# Patient Record
Sex: MALE | Race: WHITE
[De-identification: names, ages, dates, MRNs, and addresses within clinical notes are randomized per-mention and may not be internally consistent; named-entity substitution may affect disease eponyms.]

---

## 2018-09-17 ENCOUNTER — HOSPITAL ENCOUNTER (EMERGENCY)
Dept: HOSPITAL 97 - ER | Age: 33
Discharge: HOME | End: 2018-09-17
Payer: SELF-PAY

## 2018-09-17 DIAGNOSIS — N23: Primary | ICD-10-CM

## 2018-09-17 DIAGNOSIS — F17.210: ICD-10-CM

## 2018-09-17 PROCEDURE — 99284 EMERGENCY DEPT VISIT MOD MDM: CPT

## 2018-09-17 PROCEDURE — 96374 THER/PROPH/DIAG INJ IV PUSH: CPT

## 2018-09-17 PROCEDURE — 81003 URINALYSIS AUTO W/O SCOPE: CPT

## 2018-09-17 NOTE — EDPHYS
Physician Documentation                                                                           

 [facility]                                                                

Name: Cristofer Pelletier                                                                                 

Age: 33 yrs                                                                                       

Sex: Male                                                                                         

: 1985                                                                                   

MRN: [id number]                                                                                   

Arrival Date: 2018                                                                          

Time: 01:58                                                                                       

Account#: [id number]                                                                            

Bed 20                                                                                            

Private MD:                                                                                       

ED Physician Javier Espinoza                                                                       

HPI:                                                                                              

                                                                                             

03:25 This 33 yrs old  Male presents to ER via EMS with complaints of Flank Pain.    gs  

03:25 The patient complains of pain in the right low back. The pain does not radiate. Onset:  gs  

      The symptoms/episode began/occurred 1 week(s) ago, and became persistent dx with 3 mm       

      kidney stone. Modifying factors: The symptoms are alleviated by nothing. the symptoms       

      are aggravated by nothing. Associated signs and symptoms: Pertinent negatives:              

      dizziness, dysuria. Severity of pain: At its worst the pain was moderate in the             

      emergency department the pain is unchanged. The patient has experienced similar             

      episodes in the past, a few times. The patient has been recently seen by a physician:       

      in the hospital.                                                                            

                                                                                                  

Historical:                                                                                       

- Allergies:                                                                                      

02:04 No Known Allergies;                                                                     ea  

- Home Meds:                                                                                      

02:04 None [Active];                                                                          ea  

- PMHx:                                                                                           

02:04 None;                                                                                   ea  

- PSHx:                                                                                           

02:04 None;                                                                                   ea  

                                                                                                  

- Immunization history:: Adult Immunizations up to date.                                          

- Social history:: Smoking status: Patient uses tobacco products, smokes one-half pack            

  cigarettes per day.                                                                             

- Ebola Screening: : No symptoms or risks identified at this time.                                

                                                                                                  

                                                                                                  

ROS:                                                                                              

03:25 All other systems are negative.                                                         gs  

                                                                                                  

Exam:                                                                                             

03:25 Head/Face:  Normocephalic, atraumatic. Eyes:  Pupils equal round and reactive to light, gs  

      extra-ocular motions intact.  Lids and lashes normal.  Conjunctiva and sclera are           

      non-icteric and not injected.  Cornea within normal limits.  Periorbital areas with no      

      swelling, redness, or edema. ENT:  Nares patent. No nasal discharge, no septal              

      abnormalities noted.  Tympanic membranes are normal and external auditory canals are        

      clear.  Oropharynx with no redness, swelling, or masses, exudates, or evidence of           

      obstruction, uvula midline.  Mucous membranes moist. Neck:  Trachea midline, no             

      thyromegaly or masses palpated, and no cervical lymphadenopathy.  Supple, full range of     

      motion without nuchal rigidity, or vertebral point tenderness.  No Meningismus.             

      Chest/axilla:  Normal chest wall appearance and motion.  Nontender with no deformity.       

      No lesions are appreciated. Cardiovascular:  Regular rate and rhythm with a normal S1       

      and S2.  No gallops, murmurs, or rubs.  Normal PMI, no JVD.  No pulse deficits.             

      Respiratory:  Lungs have equal breath sounds bilaterally, clear to auscultation and         

      percussion.  No rales, rhonchi or wheezes noted.  No increased work of breathing, no        

      retractions or nasal flaring. Abdomen/GI:  Soft, non-tender, with normal bowel sounds.      

      No distension or tympany.  No guarding or rebound.  No evidence of tenderness               

      throughout. Back:  No spinal tenderness.  No costovertebral tenderness.  Full range of      

      motion. Skin:  Warm, dry with normal turgor.  Normal color with no rashes, no lesions,      

      and no evidence of cellulitis. MS/ Extremity:  Pulses equal, no cyanosis.                   

      Neurovascular intact.  Full, normal range of motion. Neuro:  Awake and alert, GCS 15,       

      oriented to person, place, time, and situation.  Cranial nerves II-XII grossly intact.      

      Motor strength 5/5 in all extremities.  Sensory grossly intact.  Cerebellar exam            

      normal.  Normal gait.                                                                       

03:25 Constitutional: The patient appears alert, awake.                                           

                                                                                                  

Vital Signs:                                                                                      

02:06  / 97; Pulse 94; Resp 18; Temp 98.2(O); Pulse Ox 98% on R/A; Weight 72.57 kg;     ea  

      Height 6 ft. 0 in. (182.88 cm); Pain 8/10;                                                  

03:30  / 78; Pulse 72; Resp 18; Temp 98; Pulse Ox 98% ; Pain 2/10;                      ea  

02:06 Body Mass Index 21.70 (72.57 kg, 182.88 cm)                                             ea  

                                                                                                  

MDM:                                                                                              

02:39 Patient medically screened.                                                             gs  

03:25 Data reviewed: vital signs, nurses notes. Response to treatment: the patient's symptoms gs  

      have markedly improved after treatment, and as a result, I will discharge patient.          

03:30 Counseling: I had a detailed discussion with the patient and/or guardian regarding: the gs  

      presence of at least one elevated blood pressure reading (>120/80) during this              

      emergency department visit.                                                                 

                                                                                                  

                                                                                             

02:51 Order name: Urine Dipstick--Ancillary (enter results)                                   ms  

                                                                                             

02:40 Order name: Urine Dipstick-Ancillary (obtain specimen); Complete Time: 02:48            gs  

                                                                                                  

Administered Medications:                                                                         

03:00 Drug: NS 0.9% 1000 ml Route: IV; Rate: 1 bolus; Site: right antecubital;                ea  

03:00 Drug: TORadol 30 mg Route: IVP; Site: right antecubital;                                ea  

03:26 Follow up: Response: No adverse reaction; Pain is decreased                             ea  

                                                                                                  

                                                                                                  

Disposition:                                                                                      

18 03:29 Discharged to Home. Impression: Colic - renal.                                     

- Condition is Stable.                                                                            

- Discharge Instructions: Kidney Stones, Easy-to-Read.                                            

                                                                                                  

- Medication Reconciliation Form, Thank You Letter, Antibiotic Education, Prescription            

  Opioid Use form.                                                                                

- Follow up: Ana Day MD; When: 2 - 3 days; Reason: Re-evaluation by your                 

  physician.                                                                                      

                                                                                                  

                                                                                                  

                                                                                                  

Signatures:                                                                                       

Dispatcher [employer]                           Umu Nunez RN                      RN   Javier Raya MD MD gs                                                   

                                                                                                  

Corrections: (The following items were deleted from the chart)                                    

03:59 03:29 2018 03:29 Discharged to Home. Impression: Colic - renal. Condition is      ea  

      Stable. Forms are Medication Reconciliation Form, Thank You Letter, Antibiotic              

      Education, Prescription Opioid Use. Follow up: Ana Day; When: 2 - 3 days;            

      Reason: Re-evaluation by your physician. gs                                                 

                                                                                                  

**************************************************************************************************

## 2018-09-17 NOTE — ER
Nurse's Notes                                                                                     

 [facility]                                                                

Name: Cristofer Pelletier                                                                                 

Age: 33 yrs                                                                                       

Sex: Male                                                                                         

: 1985                                                                                   

MRN: [id number]                                                                                   

Arrival Date: 2018                                                                          

Time: 01:58                                                                                       

Account#: [id number]                                                                            

Bed 20                                                                                            

Private MD:                                                                                       

Diagnosis: Colic-renal                                                                            

                                                                                                  

Presentation:                                                                                     

                                                                                             

01:59 Presenting complaint: EMS states: Called to [facility], pt complaining of right flank pain ea  

      that radiates to his stomach. Pt reported he was at [facility] about a week     

      ago and was diagnosed with kidney stones. Transition of care: patient was not received      

      from another setting of care. Onset of symptoms was 2018. Risk                

      Assessment: Do you want to hurt yourself or someone else? Patient reports no desire to      

      harm self or others. Initial Sepsis Screen: Does the patient meet any 2 criteria? No.       

      Patient's initial sepsis screen is negative. Does the patient have a suspected source       

      of infection? No. Patient's initial sepsis screen is negative. Care prior to arrival:       

      None.                                                                                       

01:59 Method Of Arrival: EMS: [facility]                                                ea  

01:59 Acuity: ONEL 3                                                                           ea  

                                                                                                  

Triage Assessment:                                                                                

02:04 General: Appears uncomfortable, Behavior is calm, cooperative, appropriate for age.     ea  

      Pain: Complains of pain in right flank pain Pain radiates to abdomen Pain currently is      

      7 out of 10 on a pain scale. Quality of pain is described as aching, Pain began 30 min      

      ago. EENT: No signs and/or symptoms were reported regarding the EENT system. Neuro:         

      Level of Consciousness is awake, alert, obeys commands, Oriented to person, place,          

      time, situation. Cardiovascular: Heart tones S1 S2 present Patient's skin is warm and       

      dry. Respiratory: Airway is patent Respiratory effort is even, unlabored, Respiratory       

      pattern is regular, symmetrical, Breath sounds are clear bilaterally. GI: Abdomen is        

      non-distended. : Reports burning with urination. Derm: Skin is pink, warm \T\ dry.        

      Musculoskeletal: Circulation, motion, and sensation intact.                                 

                                                                                                  

Historical:                                                                                       

- Allergies:                                                                                      

02:04 No Known Allergies;                                                                     ea  

- Home Meds:                                                                                      

02:04 None [Active];                                                                          ea  

- PMHx:                                                                                           

02:04 None;                                                                                   ea  

- PSHx:                                                                                           

02:04 None;                                                                                   ea  

                                                                                                  

- Immunization history:: Adult Immunizations up to date.                                          

- Social history:: Smoking status: Patient uses tobacco products, smokes one-half pack            

  cigarettes per day.                                                                             

- Ebola Screening: : No symptoms or risks identified at this time.                                

                                                                                                  

                                                                                                  

Screenin:07 Abuse screen: Denies threats or abuse. Nutritional screening: No deficits noted.        ea  

      Tuberculosis screening: No symptoms or risk factors identified. Fall Risk None              

      identified.                                                                                 

                                                                                                  

Assessment:                                                                                       

03:00 Reassessment: Patient and/or family updated on plan of care and expected duration. Pain ea  

      level reassessed. Patient is alert, oriented x 3, equal unlabored respirations, skin        

      warm/dry/pink.                                                                              

03:45 Reassessment: Patient and/or family updated on plan of care and expected duration. Pain ea  

      level reassessed. Patient is alert, oriented x 3, equal unlabored respirations, skin        

      warm/dry/pink. Discharge instructions given to patient, verbalized the understanding of     

      instruction.                                                                                

                                                                                                  

Vital Signs:                                                                                      

02:06  / 97; Pulse 94; Resp 18; Temp 98.2(O); Pulse Ox 98% on R/A; Weight 72.57 kg;     ea  

      Height 6 ft. 0 in. (182.88 cm); Pain 8/10;                                                  

03:30  / 78; Pulse 72; Resp 18; Temp 98; Pulse Ox 98% ; Pain 2/10;                      ea  

02:06 Body Mass Index 21.70 (72.57 kg, 182.88 cm)                                             ea  

                                                                                                  

ED Course:                                                                                        

01:58 Patient arrived in ED.                                                                  ea  

02:03 Triage completed.                                                                       ea  

02:07 Patient has correct armband on for positive identification. Bed in low position. Call   ea  

      light in reach. Side rails up X2.                                                           

02:08 Arm band placed on right wrist. Patient placed in an exam room, on a stretcher, on      ea  

      pulse oximetry.                                                                             

02:19 Javier Espinoza MD is Attending Physician.                                              gs  

02:45 Inserted saline lock: 18 gauge in right antecubital area, using aseptic technique.      ea  

02:48 Umu Crabtree, JIMY is Primary Nurse.                                                    ea  

03:28 Ana Day MD is Referral Physician.                                              gs  

03:45 No provider procedures requiring assistance completed. IV discontinued, intact,         ea  

      bleeding controlled, No redness/swelling at site. Pressure dressing applied.                

                                                                                                  

Administered Medications:                                                                         

03:00 Drug: NS 0.9% 1000 ml Route: IV; Rate: 1 bolus; Site: right antecubital;                ea  

03:00 Drug: TORadol 30 mg Route: IVP; Site: right antecubital;                                ea  

03:26 Follow up: Response: No adverse reaction; Pain is decreased                             ea  

                                                                                                  

                                                                                                  

Outcome:                                                                                          

03:29 Discharge ordered by MD.                                                                  

03:57 Discharged to [facility]                                                           ea  

03:57 Condition: improved                                                                         

03:57 Discharge instructions given to patient, Instructed on discharge instructions, follow       

      up and referral plans. Demonstrated understanding of instructions, follow-up care.          

03:59 Patient left the ED.                                                                    ea  

                                                                                                  

Signatures:                                                                                       

Umu Crabtree RN RN ea Starr, Gregory, MD MD                                                      

                                                                                                  

**************************************************************************************************